# Patient Record
Sex: FEMALE | Race: OTHER | Employment: OTHER | URBAN - METROPOLITAN AREA
[De-identification: names, ages, dates, MRNs, and addresses within clinical notes are randomized per-mention and may not be internally consistent; named-entity substitution may affect disease eponyms.]

---

## 2022-03-03 ENCOUNTER — COMPREHENSIVE EXAM (OUTPATIENT)
Dept: URBAN - METROPOLITAN AREA CLINIC 42 | Facility: CLINIC | Age: 62
End: 2022-03-03

## 2022-03-03 DIAGNOSIS — H52.223: ICD-10-CM

## 2022-03-03 DIAGNOSIS — H52.03: ICD-10-CM

## 2022-03-03 DIAGNOSIS — Z46.0: ICD-10-CM

## 2022-03-03 DIAGNOSIS — H52.4: ICD-10-CM

## 2022-03-03 PROCEDURE — 92015 DETERMINE REFRACTIVE STATE: CPT

## 2022-03-03 PROCEDURE — 92310-2 LEVEL 2 CONTACT LENS MANAGEMENT

## 2022-03-03 PROCEDURE — 92004 COMPRE OPH EXAM NEW PT 1/>: CPT

## 2022-03-03 ASSESSMENT — VISUAL ACUITY
OS_SC: 20/200
OS_SC: 20/150
OU_SC: 20/100+2
OD_SC: 20/200
OD_SC: 20/150
OU_SC: 20/200

## 2022-03-03 ASSESSMENT — TONOMETRY
OD_IOP_MMHG: 15
OS_IOP_MMHG: 16

## 2022-03-03 ASSESSMENT — KERATOMETRY
OD_AXISANGLE2_DEGREES: 070
OD_AXISANGLE_DEGREES: 160
OS_K1POWER_DIOPTERS: 43.25
OD_K1POWER_DIOPTERS: 43.25
OS_AXISANGLE2_DEGREES: 116
OS_K2POWER_DIOPTERS: 44.00
OS_AXISANGLE_DEGREES: 26
OD_K2POWER_DIOPTERS: 43.75

## 2022-07-22 NOTE — PATIENT DISCUSSION
Trying Monovision with pt to see if she will adjust. Advised pt to try for a few days. AV will contact pt on Monday to see if she is adjusting to the monovision.  If pt does not like, pt is allowed to go back to old CL Rx.

## 2022-09-21 NOTE — PATIENT DISCUSSION
Contacts Rx given. Switched brands and went with a lower power OS to help with NV and comfort. Trials dispensed.

## 2022-12-28 ENCOUNTER — COMPREHENSIVE EXAM (OUTPATIENT)
Dept: URBAN - METROPOLITAN AREA CLINIC 42 | Facility: CLINIC | Age: 62
End: 2022-12-28

## 2022-12-28 PROCEDURE — 92310-2 LEVEL 2 CONTACT LENS MANAGEMENT

## 2022-12-28 PROCEDURE — 92015 DETERMINE REFRACTIVE STATE: CPT

## 2022-12-28 PROCEDURE — 92014 COMPRE OPH EXAM EST PT 1/>: CPT

## 2022-12-28 ASSESSMENT — KERATOMETRY
OD_K2POWER_DIOPTERS: 43.00
OD_AXISANGLE_DEGREES: 51
OS_K1POWER_DIOPTERS: 43.25
OS_AXISANGLE2_DEGREES: 40
OD_AXISANGLE2_DEGREES: 070
OD_AXISANGLE_DEGREES: 160
OD_K1POWER_DIOPTERS: 43.50
OD_K2POWER_DIOPTERS: 43.75
OS_K2POWER_DIOPTERS: 43.25
OS_K1POWER_DIOPTERS: 44.00
OS_AXISANGLE_DEGREES: 26
OS_AXISANGLE2_DEGREES: 116
OD_K1POWER_DIOPTERS: 43.25
OS_K2POWER_DIOPTERS: 44.00
OD_AXISANGLE2_DEGREES: 141
OS_AXISANGLE_DEGREES: 130

## 2022-12-28 ASSESSMENT — TONOMETRY
OD_IOP_MMHG: 18
OS_IOP_MMHG: 19

## 2023-11-27 ENCOUNTER — COMPREHENSIVE EXAM (OUTPATIENT)
Dept: URBAN - METROPOLITAN AREA CLINIC 42 | Facility: CLINIC | Age: 63
End: 2023-11-27

## 2023-11-27 DIAGNOSIS — H52.223: ICD-10-CM

## 2023-11-27 DIAGNOSIS — Z01.00: ICD-10-CM

## 2023-11-27 DIAGNOSIS — Z46.0: ICD-10-CM

## 2023-11-27 DIAGNOSIS — H52.03: ICD-10-CM

## 2023-11-27 DIAGNOSIS — H52.4: ICD-10-CM

## 2023-11-27 PROCEDURE — 92014 COMPRE OPH EXAM EST PT 1/>: CPT

## 2023-11-27 PROCEDURE — 92310-2 LEVEL 2 CONTACT LENS MANAGEMENT

## 2023-11-27 PROCEDURE — 92015 DETERMINE REFRACTIVE STATE: CPT

## 2023-11-27 ASSESSMENT — KERATOMETRY
OD_K2POWER_DIOPTERS: 43.75
OS_AXISANGLE_DEGREES: 26
OS_AXISANGLE2_DEGREES: 37
OD_K1POWER_DIOPTERS: 43.25
OS_AXISANGLE2_DEGREES: 116
OS_AXISANGLE_DEGREES: 130
OS_K2POWER_DIOPTERS: 44.00
OD_AXISANGLE2_DEGREES: 070
OD_K2POWER_DIOPTERS: 43.50
OS_K1POWER_DIOPTERS: 43.25
OD_AXISANGLE_DEGREES: 51
OS_K1POWER_DIOPTERS: 44.25
OD_AXISANGLE2_DEGREES: 141
OD_AXISANGLE_DEGREES: 72
OD_K1POWER_DIOPTERS: 44.00
OD_AXISANGLE_DEGREES: 160
OS_K2POWER_DIOPTERS: 43.25
OD_K2POWER_DIOPTERS: 43.00
OS_AXISANGLE2_DEGREES: 40
OS_K1POWER_DIOPTERS: 44.00
OD_AXISANGLE2_DEGREES: 162
OS_AXISANGLE_DEGREES: 127
OD_K1POWER_DIOPTERS: 43.50

## 2023-11-27 ASSESSMENT — TONOMETRY
OD_IOP_MMHG: 20
OS_IOP_MMHG: 22